# Patient Record
Sex: MALE | Race: WHITE
[De-identification: names, ages, dates, MRNs, and addresses within clinical notes are randomized per-mention and may not be internally consistent; named-entity substitution may affect disease eponyms.]

---

## 2019-07-15 ENCOUNTER — HOSPITAL ENCOUNTER (OUTPATIENT)
Dept: HOSPITAL 19 - COL.RAD | Age: 54
End: 2019-07-15
Attending: FAMILY MEDICINE
Payer: COMMERCIAL

## 2019-07-15 DIAGNOSIS — N21.0: Primary | ICD-10-CM

## 2023-10-04 ENCOUNTER — HOSPITAL ENCOUNTER (OUTPATIENT)
Dept: HOSPITAL 19 - COL.ER | Age: 58
Setting detail: OBSERVATION
LOS: 1 days | Discharge: HOME | End: 2023-10-05
Attending: UROLOGY | Admitting: UROLOGY
Payer: COMMERCIAL

## 2023-10-04 VITALS — DIASTOLIC BLOOD PRESSURE: 87 MMHG | SYSTOLIC BLOOD PRESSURE: 144 MMHG | HEART RATE: 55 BPM | TEMPERATURE: 99.4 F

## 2023-10-04 VITALS — BODY MASS INDEX: 42.64 KG/M2 | HEIGHT: 72.01 IN | WEIGHT: 314.82 LBS

## 2023-10-04 DIAGNOSIS — Z99.81: ICD-10-CM

## 2023-10-04 DIAGNOSIS — G47.33: ICD-10-CM

## 2023-10-04 DIAGNOSIS — N20.1: Primary | ICD-10-CM

## 2023-10-04 DIAGNOSIS — N13.5: ICD-10-CM

## 2023-10-04 LAB
ALBUMIN SERPL-MCNC: 3.9 GM/DL (ref 3.5–5)
ALP SERPL-CCNC: 109 U/L (ref 40–150)
ALT SERPL-CCNC: 37 U/L (ref 0–55)
ANION GAP SERPL CALC-SCNC: 11 MMOL/L (ref 7–16)
AST SERPL-CCNC: 24 U/L (ref 5–34)
BASOPHILS # BLD: 0.1 K/MM3 (ref 0–0.2)
BASOPHILS NFR BLD AUTO: 0.5 % (ref 0–2)
BILIRUB SERPL-MCNC: 1.1 MG/DL (ref 0.2–1.2)
BUN SERPL-MCNC: 25 MG/DL (ref 8–26)
CALCIUM SERPL-MCNC: 9.5 MG/DL (ref 8.4–10.2)
CHLORIDE SERPL-SCNC: 106 MMOL/L (ref 98–107)
CO2 SERPL-SCNC: 21 MMOL/L (ref 22–29)
CREAT SERPL-SCNC: 1.62 MG/DL (ref 0.72–1.25)
CRP SERPL-MCNC: 3.17 MG/DL (ref 0–0.5)
EOSINOPHIL # BLD: 0.1 K/MM3 (ref 0–0.7)
EOSINOPHIL NFR BLD: 1.1 % (ref 0–4)
ERYTHROCYTE [DISTWIDTH] IN BLOOD BY AUTOMATED COUNT: 12.7 % (ref 11.5–14.5)
GLUCOSE SERPL-MCNC: 90 MG/DL (ref 70–99)
GRANULOCYTES # BLD AUTO: 72.8 % (ref 42.2–75.2)
HCT VFR BLD AUTO: 46 % (ref 42–52)
HGB BLD-MCNC: 15.8 G/DL (ref 13.5–18)
LIPASE SERPL-CCNC: 16 U/L (ref 8–78)
LYMPHOCYTES # BLD: 1.5 K/MM3 (ref 1.2–3.4)
LYMPHOCYTES NFR BLD: 14.3 % (ref 20–51)
MCH RBC QN AUTO: 32 PG (ref 27–31)
MCHC RBC AUTO-ENTMCNC: 34 G/DL (ref 33–37)
MCV RBC AUTO: 93 FL (ref 80–100)
MONOCYTES # BLD: 1.1 K/MM3 (ref 0.1–0.6)
MONOCYTES NFR BLD AUTO: 10.3 % (ref 1.7–9.3)
NEUTROPHILS # BLD: 7.5 K/MM3 (ref 1.4–6.5)
PH UR STRIP.AUTO: 6 [PH] (ref 5–8.5)
PLATELET # BLD AUTO: 275 K/MM3 (ref 130–400)
PMV BLD AUTO: 9.5 FL (ref 7.4–10.4)
POTASSIUM SERPL-SCNC: 4 MMOL/L (ref 3.5–4.5)
PROT SERPL-MCNC: 7.1 GM/DL (ref 6.2–8.1)
RBC # BLD AUTO: 4.96 M/MM3 (ref 4.2–5.6)
RBC # UR STRIP.AUTO: (no result) /UL
RBC # UR: (no result) /HPF (ref 0–2)
SODIUM SERPL-SCNC: 138 MMOL/L (ref 136–145)
SP GR UR STRIP.AUTO: 1.02 (ref 1–1.03)
SQUAMOUS # URNS: (no result) /HPF (ref 0–10)
UA DIPSTICK PNL UR STRIP.AUTO: (no result)
URN COLLECT METHOD CLASS: (no result)
UROBILINOGEN UR STRIP.AUTO-MCNC: 0.2 E.U/DL (ref 0.2–1)

## 2023-10-04 PROCEDURE — C2617 STENT, NON-COR, TEM W/O DEL: HCPCS

## 2023-10-04 PROCEDURE — C1894 INTRO/SHEATH, NON-LASER: HCPCS

## 2023-10-04 PROCEDURE — G0378 HOSPITAL OBSERVATION PER HR: HCPCS

## 2023-10-04 PROCEDURE — C1769 GUIDE WIRE: HCPCS

## 2023-10-05 VITALS — DIASTOLIC BLOOD PRESSURE: 87 MMHG | TEMPERATURE: 97.6 F | HEART RATE: 51 BPM | SYSTOLIC BLOOD PRESSURE: 149 MMHG

## 2023-10-05 VITALS — SYSTOLIC BLOOD PRESSURE: 150 MMHG | DIASTOLIC BLOOD PRESSURE: 83 MMHG | TEMPERATURE: 98.4 F | HEART RATE: 69 BPM

## 2023-10-05 VITALS — SYSTOLIC BLOOD PRESSURE: 171 MMHG | DIASTOLIC BLOOD PRESSURE: 93 MMHG | HEART RATE: 65 BPM

## 2023-10-05 VITALS — SYSTOLIC BLOOD PRESSURE: 152 MMHG | DIASTOLIC BLOOD PRESSURE: 72 MMHG | HEART RATE: 58 BPM

## 2023-10-05 VITALS — SYSTOLIC BLOOD PRESSURE: 163 MMHG | HEART RATE: 58 BPM | DIASTOLIC BLOOD PRESSURE: 91 MMHG

## 2023-10-05 VITALS — HEART RATE: 64 BPM | SYSTOLIC BLOOD PRESSURE: 158 MMHG | DIASTOLIC BLOOD PRESSURE: 80 MMHG

## 2023-10-05 VITALS — SYSTOLIC BLOOD PRESSURE: 132 MMHG | DIASTOLIC BLOOD PRESSURE: 73 MMHG | HEART RATE: 61 BPM

## 2023-10-05 VITALS — DIASTOLIC BLOOD PRESSURE: 82 MMHG | TEMPERATURE: 98.3 F | HEART RATE: 50 BPM | SYSTOLIC BLOOD PRESSURE: 141 MMHG

## 2023-10-05 VITALS — DIASTOLIC BLOOD PRESSURE: 81 MMHG | TEMPERATURE: 98.1 F | SYSTOLIC BLOOD PRESSURE: 145 MMHG | HEART RATE: 58 BPM

## 2023-10-05 VITALS — SYSTOLIC BLOOD PRESSURE: 159 MMHG | HEART RATE: 63 BPM | DIASTOLIC BLOOD PRESSURE: 86 MMHG

## 2023-10-05 VITALS — SYSTOLIC BLOOD PRESSURE: 155 MMHG | HEART RATE: 60 BPM | DIASTOLIC BLOOD PRESSURE: 82 MMHG

## 2023-10-05 VITALS — SYSTOLIC BLOOD PRESSURE: 150 MMHG

## 2023-10-05 NOTE — NUR
pt resting in bed. denies pain at this time besides a mild headache.
assessment complete. fluids infusing in right ac. call light in reach. no
needs at this time.

## 2023-10-05 NOTE — NUR
LCSW met with pt and his wife, Thu to complete initial intake for
assigned SW. Pt reports he is schedule for surgery this afternoon. Pt was
cooperative and oriented to person, time, place and situation. He states he is
independent in his ADL's/IADL's and ambulatory. Pt is a general mgr for the
[employer] and he has been  x 31 years and 2 adult sons. His primary
care provider is Dr. Pop Snyder and he fills his medications thru [facility]. Pt reports he does not have a HCPOA, but expressed interest in
implementing one. SW presented pt with paperwork and provided education. Pt
stated he and wife will further discuss and complete paperwork in the near
future. Pt does not anticipate a need for HH services @ discharge.

## 2023-10-05 NOTE — NUR
RECEIVED CHANGE OF SHIFT REPORT FROM DAY SHIFT RN. FAMILY AT BEDSIDE DURING
REPORT. INT IN PLACE. VALLADARES TO DD DRAING RED TINGED URINE. DENIES CHEST
PAIN/SOA. REPORTS HAVING PAIN TO TIP OF PENIS

## 2023-10-05 NOTE — NUR
PATIENT REPORTS PAIN SUBSIDED AFTER PAIN MEDS GIVEN. DENIES CHEST
PAIN/SOA/NAUSEA. WIFE AT BEDSIDE. DISCHARGE INSTRUCTIONS REVIEWED WITH
PATIENT/SPOUSE WITH NO FURTHER QUESTIONS OR CONCERNS VOICED. PAPERWORK SIGNED
BY PATIENT CONFIRMING THAT PATIENT RECEIVED PRINTED DISCHARGE INSTRUCTIONS.
IV SITE REMOVED FOR DISCHARGE.

## 2023-10-05 NOTE — NUR
called dr peña to update about elevated blood pressure post surgery, still ok
for discharge. pt reports pain where catheter is inserted. pain medication
given. report given to night shift nurse

## 2023-10-05 NOTE — NUR
PATIENT ESCORTED OFF UNIT FOR DISCHARGE PER W/C, ACCOMPANIED BY STAFF PCT,
PATIENT TO BE TRANSPORTED BY PRIVATE VEHICLE WITH WIFE. PATIENT WITH NO
ADDITIONAL NEEDS OR CONCERNS REPORTED AT TIME OF DISCHARGE OFF UNIT.

## 2024-08-16 ENCOUNTER — HOSPITAL ENCOUNTER (OUTPATIENT)
Dept: HOSPITAL 19 - COL.RAD | Age: 59
End: 2024-08-16
Payer: COMMERCIAL

## 2024-08-16 DIAGNOSIS — Z87.448: ICD-10-CM

## 2024-08-16 DIAGNOSIS — K76.0: Primary | ICD-10-CM

## 2024-08-16 DIAGNOSIS — R16.0: ICD-10-CM
